# Patient Record
Sex: MALE | Race: WHITE | URBAN - METROPOLITAN AREA
[De-identification: names, ages, dates, MRNs, and addresses within clinical notes are randomized per-mention and may not be internally consistent; named-entity substitution may affect disease eponyms.]

---
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
## 2022-03-15 ENCOUNTER — EMERGENCY (EMERGENCY)
Facility: HOSPITAL | Age: 22
LOS: 0 days | Discharge: HOME | End: 2022-03-15
Attending: EMERGENCY MEDICINE | Admitting: EMERGENCY MEDICINE
Payer: COMMERCIAL

## 2022-03-15 VITALS
TEMPERATURE: 98 F | RESPIRATION RATE: 18 BRPM | OXYGEN SATURATION: 100 % | DIASTOLIC BLOOD PRESSURE: 56 MMHG | WEIGHT: 179.9 LBS | HEART RATE: 66 BPM | SYSTOLIC BLOOD PRESSURE: 110 MMHG
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
## 2022-03-15 DIAGNOSIS — S92.512A DISPLACED FRACTURE OF PROXIMAL PHALANX OF LEFT LESSER TOE(S), INITIAL ENCOUNTER FOR CLOSED FRACTURE: ICD-10-CM
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
## 2022-03-15 DIAGNOSIS — M79.672 PAIN IN LEFT FOOT: ICD-10-CM
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
## 2022-03-15 PROCEDURE — 73610 X-RAY EXAM OF ANKLE: CPT | Mod: 26,LT

## 2022-03-15 PROCEDURE — 99284 EMERGENCY DEPT VISIT MOD MDM: CPT

## 2022-03-15 PROCEDURE — 73630 X-RAY EXAM OF FOOT: CPT | Mod: 26,LT

## 2022-03-15 PROCEDURE — 99053 MED SERV 10PM-8AM 24 HR FAC: CPT

## 2022-03-15 RX ORDER — IBUPROFEN 200 MG
600 TABLET ORAL ONCE
Refills: 0 | Status: COMPLETED | OUTPATIENT
Start: 2022-03-15 | End: 2022-03-15

## 2022-03-15 RX ADMIN — Medication 600 MILLIGRAM(S): at 23:34

## 2022-03-15 NOTE — ED PROVIDER NOTE - WR ORDER NAME 2
[facility]    If you have any questions regarding to your visit please contact your care team:       Team Purple:   Clinic Hours Telephone Number   Dr. Lola Espino   7am-7pm  Monday - Thursday   7am-5pm  Fridays  [phone]  (Appointment scheduling available 24/7)    Questions about your recent visit?   Team Line:  [phone]   Urgent Care - [place] and [facility] - 11am-9pm Monday-Friday Saturday-Sunday- 9am-5pm   [place] - 5pm-9pm Monday-Friday Saturday-Sunday- 9am-5pm  [phone] - [place]  [phone] - [place]       What options do I have for a visit other than an office visit? We offer electronic visits (e-visits) and telephone visits, when medically appropriate.  Please check with your medical insurance to see if these types of visits are covered, as you will be responsible for any charges that are not paid by your insurance.      You can use [employer] (secure electronic communication) to access to your chart, send your primary care provider a message, or make an appointment. Ask a team member how to get started.     For a price quote for your services, please call our Consumer Price Line at [phone] or our Imaging Cost estimation line at [phone] (for imaging tests).    Wilfrid Carrasquillo    
Xray Ankle Complete 3 Views, Left
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
## 2022-03-15 NOTE — ED PROVIDER NOTE - CARE PROVIDER_API CALL
Silviano Santamaria)  [facility]  [address] [place] [address] [place] [address]  Phone: [phone]  Fax: [phone]  Follow Up Time: 4-6 Days

## 2022-03-15 NOTE — ED PROVIDER NOTE - PATIENT PORTAL LINK FT
You can access the FollowMyHealth Patient Portal offered by [facility] by registering at the following website: [email or web address]. By joining [facility]’s FollowMyHealth portal, you will also be able to view your health information using other applications (apps) compatible with our system.

## 2022-03-15 NOTE — ED PROVIDER NOTE - NSFOLLOWUPCLINICS_GEN_ALL_ED_FT
[facility]  Podiatry  .  [place]   Phone: [phone]  Fax:   Follow Up Time: 4-6 Days

## 2022-03-15 NOTE — ED PROVIDER NOTE - NSFOLLOWUPINSTRUCTIONS_ED_ALL_ED_FT
Toe Fracture    WHAT YOU NEED TO KNOW:    A toe fracture is a break in a bone in your toe.     DISCHARGE INSTRUCTIONS:    Seek care immediately if:   •Blood soaks through your bandage.  •You have severe pain in your toe.  •Your toe is cold or numb.    Call your doctor if:   •You have a fever.  •Your pain does not go away, even after treatment.  •Your toe continues to hurt even after it has healed.  •You have questions or concerns about your condition or care.    Medicines: You may need any of the following:   •NSAIDs, such as ibuprofen, help decrease swelling, pain, and fever. This medicine is available with or without a doctor's order. NSAIDs can cause stomach bleeding or kidney problems in certain people. If you take blood thinner medicine, always ask your healthcare provider if NSAIDs are safe for you. Always read the medicine label and follow directions.    •Prescription pain medicine may be given. Ask your healthcare provider how to take this medicine safely. Some prescription pain medicines contain acetaminophen. Do not take other medicines that contain acetaminophen without talking to your healthcare provider. Too much acetaminophen may cause liver damage. Prescription pain medicine may cause constipation. Ask your healthcare provider how to prevent or treat constipation.     •Antibiotics treat a bacterial infection. You may need antibiotics if you have an open wound.    •Take your medicine as directed. Contact your healthcare provider if you think your medicine is not helping or if you have side effects. Tell him of her if you are allergic to any medicine. Keep a list of the medicines, vitamins, and herbs you take. Include the amounts, and when and why you take them. Bring the list or the pill bottles to follow-up visits. Carry your medicine list with you in case of an emergency.    Self-care:   •Rest your toe so that it can heal. Return to normal activities as directed.    •Apply ice on your toe for 15 to 20 minutes every hour or as directed. Use an ice pack, or put crushed ice in a plastic bag. Cover it with a towel before you put it on your toe. Ice helps prevent tissue damage and decreases swelling and pain.    •Elevate your toe above the level of your heart as often as you can. This will help decrease swelling and pain. Prop your toe on pillows or blankets to keep it elevated comfortably.     •Use halle tape, an elastic bandage, or a splint as directed. These help keep your toe in its correct position as it heals. Halle tape means your fractured toe and the toe next to it are taped together.    •Use a support device such as a cane, crutches, walking boot, or hard soled shoe as directed. These help protect your toe and limit movement so it can heal.     Follow up with your doctor as directed: You may need to return in 2 to 4 weeks. Write down your questions so you remember to ask them during your visits.

## 2022-03-15 NOTE — ED PROVIDER NOTE - OBJECTIVE STATEMENT
Healthy, vaccinated 20-year-old male presents to the ED with left-sided foot pain.  Patient notes he was at kickboxing, kicked an opponent and had immediate pain to left second toe.  Pain is worse with ambulating.

## 2022-03-15 NOTE — ED PROVIDER NOTE - PHYSICAL EXAMINATION
VITAL SIGNS: I have reviewed nursing notes and confirm.  CONSTITUTIONAL: Well-developed; well-nourished; in no acute distress.  SKIN: Skin exam is warm and dry, no acute rash.  HEAD: Normocephalic; atraumatic.  EYES: PERRL, EOM intact; conjunctiva and sclera clear.  ENT: No nasal discharge; airway clear.  NECK: Supple; non tender.  CARD: RRR, no murmur  RESP: No wheezes, rales or rhonchi.  ABD: Normal bowel sounds; soft; non-distended; non-tender  EXT: significant swelling to L 2nd toe, pain with palpation, ROM, antalgic gait, good pulses  NEURO: Alert, oriented. Grossly unremarkable. No focal deficits.  PSYCH: Cooperative, appropriate.

## 2022-03-21 ENCOUNTER — APPOINTMENT (OUTPATIENT)
Dept: PODIATRY | Facility: CLINIC | Age: 22
End: 2022-03-21
Payer: COMMERCIAL

## 2022-03-21 ENCOUNTER — RESULT REVIEW (OUTPATIENT)
Age: 22
End: 2022-03-21

## 2022-03-21 PROCEDURE — 99072 ADDL SUPL MATRL&STAF TM PHE: CPT

## 2022-03-21 PROCEDURE — 99203 OFFICE O/P NEW LOW 30 MIN: CPT

## 2022-03-21 NOTE — ASSESSMENT
[FreeTextEntry1] : Xrays reviewed\par Body splint\par Darco shoe\par RTO 5 weeks with new xrays\par  [Verbal] : verbal [Patient] : patient [Good - alert, interested, motivated] : Good - alert, interested, motivated [Demonstrates independently] : demonstrates independently

## 2022-03-21 NOTE — REASON FOR VISIT
[Initial Visit] : an initial visit for [Confirmed Foot Fracture] : confirmed foot fracture [Foot Pain] : foot pain [FreeTextEntry2] : toe fracture

## 2022-03-21 NOTE — HISTORY OF PRESENT ILLNESS
[Post-op Sandals] : post-op sandals [FreeTextEntry1] : Left 2nd toe fracture\par - Sport related injury (kickboxing) \par - Very painful, Went to ED, Xrays confirmed toe fracture\par - Body splint and Darco shoe\par - Minimal pain today\par

## 2022-03-21 NOTE — PHYSICAL EXAM
[Delayed in the Right Toes] : capillary refills normal in right toes [Delayed in the Left Toes] : capillary refills normal in the left toes [2+] : left foot dorsalis pedis 2+ [de-identified] : pain with palpation and ROM of the 2nd toe left foot.  [FreeTextEntry1] : Swelling and bruising of the 2nd toe L foot [Sensation] : the sensory exam was normal to light touch and pinprick [No Focal Deficits] : no focal deficits

## 2022-04-22 ENCOUNTER — OUTPATIENT (OUTPATIENT)
Dept: OUTPATIENT SERVICES | Facility: HOSPITAL | Age: 22
LOS: 1 days | Discharge: HOME | End: 2022-04-22
Payer: COMMERCIAL

## 2022-04-22 DIAGNOSIS — M79.672 PAIN IN LEFT FOOT: ICD-10-CM

## 2022-04-22 PROCEDURE — 73630 X-RAY EXAM OF FOOT: CPT | Mod: 26,LT

## 2022-04-25 ENCOUNTER — APPOINTMENT (OUTPATIENT)
Dept: PODIATRY | Facility: CLINIC | Age: 22
End: 2022-04-25
Payer: COMMERCIAL

## 2022-04-25 VITALS
SYSTOLIC BLOOD PRESSURE: 122 MMHG | BODY MASS INDEX: 23.74 KG/M2 | DIASTOLIC BLOOD PRESSURE: 78 MMHG | TEMPERATURE: 97.1 F | HEIGHT: 74 IN | WEIGHT: 185 LBS | HEART RATE: 76 BPM | OXYGEN SATURATION: 91 %

## 2022-04-25 DIAGNOSIS — M79.672 PAIN IN LEFT FOOT: ICD-10-CM

## 2022-04-25 DIAGNOSIS — S92.515A NONDISPLACED FRACTURE OF PROXIMAL PHALANX OF LEFT LESSER TOE(S), INITIAL ENCOUNTER FOR CLOSED FRACTURE: ICD-10-CM

## 2022-04-25 PROCEDURE — 99072 ADDL SUPL MATRL&STAF TM PHE: CPT

## 2022-04-25 PROCEDURE — 99213 OFFICE O/P EST LOW 20 MIN: CPT

## 2022-04-25 NOTE — HISTORY OF PRESENT ILLNESS
[Post-op Sandals] : post-op sandals [FreeTextEntry1] : Left 2nd toe fracture\par - Body splint and Darco shoe\par - No pain\par - Getting better \par - new xrays taken

## 2022-04-25 NOTE — REASON FOR VISIT
[Follow-Up Visit] : a follow-up visit for [Confirmed Foot Fracture] : confirmed foot fracture [Foot Pain] : foot pain [FreeTextEntry2] : toe fracture

## 2022-04-25 NOTE — ASSESSMENT
[Verbal] : verbal [Patient] : patient [Good - alert, interested, motivated] : Good - alert, interested, motivated [Demonstrates independently] : demonstrates independently [FreeTextEntry1] : Xrays reviewed - fracture healing \par Darco shoe for one more week and then transition to regular shoe \par RTO PRN\par

## 2022-04-25 NOTE — PHYSICAL EXAM
[2+] : left foot dorsalis pedis 2+ [Skin Lesions] : no skin lesions [Sensation] : the sensory exam was normal to light touch and pinprick [No Focal Deficits] : no focal deficits [Delayed in the Right Toes] : capillary refills normal in right toes [Delayed in the Left Toes] : capillary refills normal in the left toes [de-identified] : Mild pain with palpation and ROM of the 2nd toe left foot.  [FreeTextEntry1] : Mild Swelling of the 2nd toe L foot

## 2022-07-14 ENCOUNTER — EMERGENCY (EMERGENCY)
Facility: HOSPITAL | Age: 22
LOS: 0 days | Discharge: HOME | End: 2022-07-14
Attending: EMERGENCY MEDICINE | Admitting: EMERGENCY MEDICINE

## 2022-07-14 VITALS
DIASTOLIC BLOOD PRESSURE: 63 MMHG | HEART RATE: 65 BPM | SYSTOLIC BLOOD PRESSURE: 127 MMHG | TEMPERATURE: 97 F | RESPIRATION RATE: 20 BRPM | WEIGHT: 180.34 LBS | OXYGEN SATURATION: 100 %

## 2022-07-14 PROCEDURE — 99284 EMERGENCY DEPT VISIT MOD MDM: CPT

## 2022-07-14 PROCEDURE — 99053 MED SERV 10PM-8AM 24 HR FAC: CPT

## 2022-07-14 PROCEDURE — 90792 PSYCH DIAG EVAL W/MED SRVCS: CPT | Mod: GC

## 2022-07-14 RX ORDER — SERTRALINE 25 MG/1
1 TABLET, FILM COATED ORAL
Qty: 30 | Refills: 0
Start: 2022-07-14 | End: 2022-08-12

## 2022-07-14 RX ORDER — HYDROXYZINE HCL 10 MG
1 TABLET ORAL
Qty: 90 | Refills: 0
Start: 2022-07-14 | End: 2022-08-12

## 2022-07-14 NOTE — ED PEDIATRIC NURSE NOTE - OBJECTIVE STATEMENT
pt presents with anxiety, reports that he has a lot going on at home and school   pt is calm and cooperative, pt placed in a hospital gown and belongings placed in hospital bag with mother

## 2022-07-14 NOTE — ED BEHAVIORAL HEALTH ASSESSMENT NOTE - NSBHATTESTCOMMENTATTENDFT_PSY_A_CORE
21 yo male with history of anxiety in treatment with therapist, but not on any psychotropic medications, who presents to the ED for worsening anxious mood and increasing panic attack occurring as often as 6 times a day for the past weeks. He presents to ED for Mental health evaluation and service connection, and on evaluation he is observed to be calm and cooperative, but there is overt presence of anxiety is the setting of stressor related to discordance with his father, stress at work and persistent marijuana use. This patient also has component of ADHD symptoms which will require further evaluation in the outpatient setting. Also, due to increase in severity of panic attack, he will strongly benefit from low dose of sertraline of 25mg po every morning. Psychoeducation provided to patient and mother who were both at bedside. There is no psychiatric contraindication to discharge this patient. He will be referred to [facility] outpatient psychiatry service located at [address] [place] [address], [id number], [phone] and patient will contacted for appointment date.

## 2022-07-14 NOTE — ED PROVIDER NOTE - OBJECTIVE STATEMENT
23 yo male, no PMHx, presents with anxiety. He states that over the last few weeks he has been under more stress and having anxiety attacks 5-6 times per day, no alleviating or aggravating factors, no associated symptoms. He is currently seeing a therapist but is unable to see a psychiatrist. Denies chest pain, SOB, nausea, vomiting, headache, dizziness, fevers, SI/HI, AVH.

## 2022-07-14 NOTE — ED BEHAVIORAL HEALTH ASSESSMENT NOTE - REFERRAL / APPOINTMENT DETAILS
We recommend that the patient be referred for outpatient psychiatric and psychotherapy services, to [facility] ([facility]D), [address] [place] [address], phone number : [phone]. Please ensure that this referral information is included in discharge document.

## 2022-07-14 NOTE — ED PEDIATRIC TRIAGE NOTE - NS_BH TRG Q4C_ED_A_ED
[FreeTextEntry1] : alert and oriented x 3, speech fluent, names easily, follows requests, good recall for recent and remote events.\par EOM VFF, full without sustained nystagmus, PERRL, face symmetrical, no dysarthria\par Motor - full strength in all extremities. normal rapid-alternating movements.\par Sensory - intact LT bilaterally\par Coord - no tremor, ataxia\par Gait - stands without difficulty, normal gait.\par 
No

## 2022-07-14 NOTE — ED PROVIDER NOTE - NSFOLLOWUPINSTRUCTIONS_ED_ALL_ED_FT
Managing Anxiety, Adult      After being diagnosed with an anxiety disorder, you may be relieved to know why you have felt or behaved a certain way. You may also feel overwhelmed about the treatment ahead and what it will mean for your life. With care and support, you can manage this condition and recover from it.      How to manage lifestyle changes      Managing stress and anxiety      Stress is your body's reaction to life changes and events, both good and bad. Most stress will last just a few hours, but stress can be ongoing and can lead to more than just stress. Although stress can play a major role in anxiety, it is not the same as anxiety. Stress is usually caused by something external, such as a deadline, test, or competition. Stress normally passes after the triggering event has ended.     Anxiety is caused by something internal, such as imagining a terrible outcome or worrying that something will go wrong that will devastate you. Anxiety often does not go away even after the triggering event is over, and it can become long-term (chronic) worry. It is important to understand the differences between stress and anxiety and to manage your stress effectively so that it does not lead to an anxious response.    Talk with your health care provider or a counselor to learn more about reducing anxiety and stress. He or she may suggest tension reduction techniques, such as:  •Music therapy. This can include creating or listening to music that you enjoy and that inspires you.      •Mindfulness-based meditation. This involves being aware of your normal breaths while not trying to control your breathing. It can be done while sitting or walking.      •Centering prayer. This involves focusing on a word, phrase, or sacred image that means something to you and brings you peace.      •Deep breathing. To do this, expand your stomach and inhale slowly through your nose. Hold your breath for 3–5 seconds. Then exhale slowly, letting your stomach muscles relax.      •Self-talk. This involves identifying thought patterns that lead to anxiety reactions and changing those patterns.      •Muscle relaxation. This involves tensing muscles and then relaxing them.      Choose a tension reduction technique that suits your lifestyle and personality. These techniques take time and practice. Set aside 5–15 minutes a day to do them. Therapists can offer counseling and training in these techniques. The training to help with anxiety may be covered by some insurance plans. Other things you can do to manage stress and anxiety include:  •Keeping a stress/anxiety diary. This can help you learn what triggers your reaction and then learn ways to manage your response.      •Thinking about how you react to certain situations. You may not be able to control everything, but you can control your response.      •Making time for activities that help you relax and not feeling guilty about spending your time in this way.      •Visual imagery and yoga can help you stay calm and relax.      Medicines     Medicines can help ease symptoms. Medicines for anxiety include:  •Anti-anxiety drugs.      •Antidepressants.      Medicines are often used as a primary treatment for anxiety disorder. Medicines will be prescribed by a health care provider. When used together, medicines, psychotherapy, and tension reduction techniques may be the most effective treatment.    Relationships    Relationships can play a big part in helping you recover. Try to spend more time connecting with trusted friends and family members. Consider going to couples counseling, taking family education classes, or going to family therapy. Therapy can help you and others better understand your condition.      How to recognize changes in your anxiety    Everyone responds differently to treatment for anxiety. Recovery from anxiety happens when symptoms decrease and stop interfering with your daily activities at home or work. This may mean that you will start to:  •Have better concentration and focus. Worry will interfere less in your daily thinking.      •Sleep better.      •Be less irritable.      •Have more energy.      •Have improved memory.      It is important to recognize when your condition is getting worse. Contact your health care provider if your symptoms interfere with home or work and you feel like your condition is not improving.      Follow these instructions at home:    Activity   •Exercise. Most adults should do the following:  •Exercise for at least 150 minutes each week. The exercise should increase your heart rate and make you sweat (moderate-intensity exercise).      •Strengthening exercises at least twice a week.        •Get the right amount and quality of sleep. Most adults need 7–9 hours of sleep each night.        Lifestyle      •Eat a healthy diet that includes plenty of vegetables, fruits, whole grains, low-fat dairy products, and lean protein. Do not eat a lot of foods that are high in solid fats, added sugars, or salt.      •Make choices that simplify your life.      • Do not use any products that contain nicotine or tobacco, such as cigarettes, e-cigarettes, and chewing tobacco. If you need help quitting, ask your health care provider.      •Avoid caffeine, alcohol, and certain over-the-counter cold medicines. These may make you feel worse. Ask your pharmacist which medicines to avoid.      General instructions     •Take over-the-counter and prescription medicines only as told by your health care provider.      •Keep all follow-up visits as told by your health care provider. This is important.        Where to find support    You can get help and support from these sources:  •Self-help groups.      •Online and community organizations.      •A trusted spiritual leader.      •Couples counseling.      •Family education classes.      •Family therapy.        Where to find more information    You may find that joining a support group helps you deal with your anxiety. The following sources can help you locate counselors or support groups near you:  •Mental Health [place]: www.mentalhealthamerica.net      •Anxiety and Depression Association of [place] (ADAA): www.adaa.org      •National [place] on Mental Illness (LISA): www.lisa.org        Contact a health care provider if you:    •Have a hard time staying focused or finishing daily tasks.      •Spend many hours a day feeling worried about everyday life.      •Become exhausted by worry.      •Start to have headaches, feel tense, or have nausea.      •Urinate more than normal.      •Have diarrhea.        Get help right away if you have:    •A racing heart and shortness of breath.      •Thoughts of hurting yourself or others.      If you ever feel like you may hurt yourself or others, or have thoughts about taking your own life, get help right away. You can go to your nearest emergency department or call:    • Your local emergency services (911 in the [place]       • A suicide crisis helpline, such as the National Suicide Prevention Lifeline at 1-338.964.9253. This is open 24 hours a day.         Summary    •Taking steps to learn and use tension reduction techniques can help calm you and help prevent triggering an anxiety reaction.      •When used together, medicines, psychotherapy, and tension reduction techniques may be the most effective treatment.      •Family, friends, and partners can play a big part in helping you recover from an anxiety disorder.      This information is not intended to replace advice given to you by your health care provider. Make sure you discuss any questions you have with your health care provider.

## 2022-07-14 NOTE — ED BEHAVIORAL HEALTH ASSESSMENT NOTE - HPI (INCLUDE ILLNESS QUALITY, SEVERITY, DURATION, TIMING, CONTEXT, MODIFYING FACTORS, ASSOCIATED SIGNS AND SYMPTOMS)
** **THIS IS AN INCOMPLETE NOTE. 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     Chart reviewed, patient seen and examined at bedside. On approach, patient calm and cooperative to interview, no acute distress.   Patient endorses being at the hospital for _______________________.        When asked about recent mood, patient states feeling “ _________________”     Patient denies suicidal ideation, intent, or plan on interview. Patient denies acute symptoms of depression, esmer, anxiety, PTSD, or psychosis at this time.     Patient also denies recent use of alcohol, nicotine, or illicit substances.     Psych hx:     Collateral: Saadia (mother, [phone]).    Home medications reconciled with pharmacy (name, #; prescribed by  _______ ): ** **THIS IS AN INCOMPLETE NOTE. 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 Patient endorsed 5-6 episodes per day of anxiety over the past several days to the ED. Stated that he sees a therapist, no psychiatrist. Denied SI/HI. Psych consult for mental health evaluation in setting of reported anxiety.      Chart reviewed, patient seen and examined at bedside. On approach, patient calm and cooperative to interview,     When asked about recent mood, patient states feeling “ _________________”     Patient denies suicidal ideation, intent, or plan on interview. Patient endorses acute symptoms of depression and anxiety. In terms of depressive symptoms, patient reports sleeplessness (insomnia; 3-4 hours of sleep per night), decreased interest in activities (including social activities, hobbies, and going to the gym), feelings of worthlessness, decreased energy, decreased concentration, decreased appetite (states that he does not have desire to eat), demonstrates psychomotor retardation, and denies suicidality.     Patient denies acute symptoms of esmer, PTSD, or psychosis at this time.     Patient endorses recent use of nicotine and marijuana. Patient states he has been using nicotine vape daily since his teens, and has been consuming marijuana daily since his teens. Patient denies recent use of EtOH or any illicit substances.     Psych hx: reports possible prior diagnosis of ADHD many years ago by a psychiatrist, but doesn't remember who. States that he was started on a medication for reported ADHD that worsened his feeling of anxiety, so he stopped taking it.     Collateral: Saadia (mother, [phone]).     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 Patient endorsed 5-6 episodes per day of anxiety over the past several days to the ED. Stated that he sees a therapist, no psychiatrist. Denied SI/HI. Psych consult for mental health evaluation in setting of reported anxiety.      Chart reviewed, patient seen and examined at bedside. On approach, patient calm and cooperative to interview, When asked about recent mood, patient states feeling “ _________________”     ** **THIS IS AN INCOMPLETE NOTE. PLAN HAS NOT BEEN FINALIZED BY ATTENDING*****FULL NOTE TO FOLLOW SHORTLY****       Patient denies suicidal ideation, intent, or plan on interview. Patient endorses acute symptoms of depression and anxiety. In terms of depressive symptoms, patient reports sleeplessness (insomnia; 3-4 hours of sleep per night), decreased interest in activities (including social activities, hobbies, and going to the gym), feelings of worthlessness, decreased energy, decreased concentration, decreased appetite (states that he does not have desire to eat), demonstrates psychomotor retardation, and denies suicidality.     Patient denies acute symptoms of esmer, PTSD, or psychosis at this time.     Patient endorses recent use of nicotine and marijuana. Patient states he has been using nicotine vape daily since his teens, and has been consuming marijuana daily since his teens. Patient denies recent use of EtOH or any illicit substances.     Psych hx: reports possible prior diagnosis of ADHD many years ago by a psychiatrist, but doesn't remember who. States that he was started on a medication for reported ADHD that worsened his feeling of anxiety, so he stopped taking it.     Collateral: Saadia (mother, [phone]).   ** **THIS IS AN INCOMPLETE NOTE. 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 Patient endorsed 5-6 episodes per day of anxiety over the past several days to the ED. Stated that he sees a therapist, no psychiatrist. Denied SI/HI. Psych consult for mental health evaluation in setting of reported anxiety.      Chart reviewed, patient seen and examined at bedside. On approach, patient calm and cooperative to interview, When asked about recent mood, patient states feeling “ _________________”     ** **THIS IS AN INCOMPLETE NOTE. PLAN HAS NOT BEEN FINALIZED BY ATTENDING*****FULL NOTE TO FOLLOW SHORTLY****       Patient denies suicidal ideation, intent, or plan on interview. Patient endorses acute symptoms of depression and anxiety. In terms of depressive symptoms, patient reports sleeplessness (insomnia; 3-4 hours of sleep per night), decreased interest in activities (including social activities, hobbies, and going to the gym), feelings of worthlessness, decreased energy, decreased concentration, decreased appetite (states that he does not have desire to eat), demonstrates psychomotor retardation, and denies suicidality.     Patient denies acute symptoms of esmer, PTSD, or psychosis at this time.     Patient endorses recent use of nicotine and marijuana. Patient states he has been using nicotine vape daily since his teens, and has been consuming marijuana daily since his teens. Patient denies recent use of EtOH or any illicit substances.     Psych hx: reports possible prior diagnosis of ADHD many years ago by a psychiatrist, but doesn't remember who. States that he was started on a medication for reported ADHD that worsened his feeling of anxiety, so he stopped taking it. Patient states he has been seeing a therapist, Dahiana Burr, for several years, but doesn't feel that the therapy sessions have been helping at this time.     Collateral: Saadia (mother, [phone]).   ** **THIS IS AN INCOMPLETE NOTE. 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 company, and occasionally works as  part-time) domiciled with mother, father and siblings in private residence, no PMH, no past formal psychiatric history, presents to ED brought in by mom for worsening symptoms of anxiety. Patient endorsed 5-6 episodes per day of anxiety over the past several days to the ED. Stated that he sees a therapist, no psychiatrist. Denied SI/HI. Psych consult for mental health evaluation in setting of reported anxiety.      Chart reviewed, patient seen and examined at bedside. On approach, patient calm and cooperative to interview, When asked about recent mood, patient states feeling “ _________________”     When asked about recent mood, patient states feeling "overwhelmed" and "anxious". He states that he has been experiencing anxiety for a long time and describes anxiety/panic attacks occurring intermittently. The severity and frequency of the attacks increased following a break-up with a significant other in May and were exacerbated further following a fight with his father last week.  He endorses palpitations and a feeling of "emotions coming over" him during these attacks and notes that the attacks can last between 20min - 1hr. Patient notes a sleep changes for which he was taking melatonin which seemed to help him somewhat. Patient seemed to fidget throughout the interview.  Patient states that he has numerous stresses in his life (school, work, family life) and his anxiety is exacerbated when he is at home and attributes some episodes of anxiety to when he is at work with his father. He feels he has a lot of responsibility and gets no pleasure in return. He has no history of sexual or physical abuse.    Patient denies suicidal ideation, intent, or plan on interview. Patient endorses acute symptoms of depression and anxiety. In terms of depressive symptoms, patient reports sleeplessness (insomnia; 3-4 hours of sleep per night), decreased interest in activities (including social activities, hobbies, and going to the gym), feelings of worthlessness, decreased energy, decreased concentration, decreased appetite (states that he does not have desire to eat), demonstrates psychomotor retardation, and denies suicidality.     Patient denies acute symptoms of esmer, PTSD, or psychosis at this time.     Patient endorses recent use of nicotine and marijuana. Patient states he has been using nicotine vape daily since his teens, and has been consuming marijuana daily since his teens. Patient denies recent use of EtOH or any illicit substances.     Psych hx: reports possible prior diagnosis of ADHD many years ago by a psychiatrist, but doesn't remember who. States that he was started on a medication for reported ADHD that worsened his feeling of anxiety, so he stopped taking it. Patient states he has been seeing a therapist, Dahiana Burr, for several years, but doesn't feel that the therapy sessions have been helping at this time.     Collateral: Saadia (mother, [phone]). Mother states that the patient has been "very sensitive his whole life" and "anything that happens is a big deal." States that patient has not been getting along with dad for the past 2-3 years, reportedly patient has been trying to make amends in relationship, but feels like the relationship is not improving despite these efforts. States that patient had been seeing a girl, but that their relationship was recently put on pause. States that patient's outlook is negative, patient having difficulty seeing the bright side of various situations, and outlook "getting worse" recently. Endorses patient has been experiencing recent insomnia and worsening appetite. States that patient had an argument with his father about 1 week ago, and that she has been advocating that he sees a psychiatrist. States that initially patient was hesitant, but today decided to come in to see a psychiatrist.     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 Patient endorsed 5-6 episodes per day of anxiety over the past several days to the ED. Stated that he sees a therapist, no psychiatrist. Denied SI/HI. Psych consult for mental health evaluation in setting of reported anxiety.      When asked about recent mood, patient states feeling "overwhelmed" and "anxious". He states that he has been experiencing anxiety for a long time and describes anxiety/panic attacks occurring intermittently. The severity and frequency of the attacks increased following a break-up with a significant other in May and were exacerbated further following a fight with his father last week.  He endorses palpitations and a feeling of "emotions coming over" him during these attacks and notes that the attacks can last between 20min - 1hr. Patient notes a sleep changes for which he was taking melatonin which seemed to help him somewhat. Patient seemed to fidget throughout the interview.  Patient states that he has numerous stresses in his life (school, work, family life) and his anxiety is exacerbated when he is at home and attributes some episodes of anxiety to when he is at work with his father. He feels he has a lot of responsibility and gets no pleasure in return. He has no history of sexual or physical abuse.    Patient denies suicidal ideation, intent, or plan on interview. Patient endorses acute symptoms of depression and anxiety. In terms of depressive symptoms, patient reports sleeplessness (insomnia; 3-4 hours of sleep per night), decreased interest in activities (including social activities, hobbies, and going to the gym), feelings of worthlessness, decreased energy, decreased concentration, decreased appetite (states that he does not have desire to eat), demonstrates psychomotor retardation, and denies suicidality.  Patient denies acute symptoms of esmer, PTSD, or psychosis at this time. Patient endorses recent use of nicotine and marijuana. Patient states he has been using nicotine vape daily since his teens, and has been consuming marijuana daily since his teens. Patient denies recent use of EtOH or any illicit substances.     Psych hx: reports possible prior diagnosis of ADHD many years ago by a psychiatrist, but doesn't remember who. States that he was started on a medication for reported ADHD that worsened his feeling of anxiety, so he stopped taking it. Patient states he has been seeing a therapist, Dahiana Burr, for several years, but doesn't feel that the therapy sessions have been helping at this time.     Collateral: Saadia (mother, [phone]). Mother states that the patient has been "very sensitive his whole life" and "anything that happens is a big deal." States that patient has not been getting along with dad for the past 2-3 years, reportedly patient has been trying to make amends in relationship, but feels like the relationship is not improving despite these efforts. States that patient had been seeing a girl, but that their relationship was recently put on pause. States that patient's outlook is negative, patient having difficulty seeing the bright side of various situations, and outlook "getting worse" recently. Endorses patient has been experiencing recent insomnia and worsening appetite. States that patient had an argument with his father about 1 week ago, and that she has been advocating that he sees a psychiatrist. States that initially patient was hesitant, but today decided to come in to see a psychiatrist.     Preferred Pharmacy: [address]  - no current medications

## 2022-07-14 NOTE — ED BEHAVIORAL HEALTH ASSESSMENT NOTE - ESTIMATED INTELLIGENCE
Patient wants to know if she should fast for the lab test she is having on Tuesday.   It is a BMP, advised best if she fasts.            Average

## 2022-07-14 NOTE — ED PROVIDER NOTE - PROGRESS NOTE DETAILS
CP: Patient SMART cleared. Attempted to reach psych. Unable to reach at this time. CP: discussed with psych team. patient to be sent home with 30 day supply of atarax 50 mg q8h and sertraline 25 mg daily. patient agreeable to plan and follow up OPD.

## 2022-07-14 NOTE — ED PROVIDER NOTE - NS ED ROS FT
GEN:  no fever, no chills, no generalized weakness  NEURO:  no headache, no dizziness  ENT: no sore throat, no runny nose  CV:  no chest pain, no palpitations  RESP:  no sob, no cough  GI:  no nausea, no vomiting, no abdominal pain, no diarrhea  :  no dysuria, no urinary frequency, no hematuria  MSK:  no joint pain, no edema  SKIN:  no rash, no bruising  PSYCH:  no homicidal ideation, no suicidal ideation, no visual hallucinations, no auditory hallucinations, +anxiety

## 2022-07-14 NOTE — ED PEDIATRIC TRIAGE NOTE - CHIEF COMPLAINT QUOTE
Pt c/o feeling anxious and having panic attacks, having suicidal thoughts, told his mother he wanted to kill himself, pt placed on 1:1 observation

## 2022-07-14 NOTE — ED PROVIDER NOTE - PHYSICAL EXAMINATION
CONSTITUTIONAL: Well-developed; well-nourished; in no acute distress.   SKIN: warm, dry  HEAD: Normocephalic; atraumatic.  EYES: no conjunctival injection  ENT: No nasal discharge  NECK: Supple  CARD: S1, S2 normal; Regular rate and rhythm.   RESP: No wheezes, rales or rhonchi.  ABD: soft ntnd  EXT: Normal ROM.  No clubbing, cyanosis or edema.   NEURO: Alert, oriented, grossly unremarkable.  PSYCH: Cooperative, appropriate.

## 2022-07-14 NOTE — ED PROVIDER NOTE - NSFOLLOWUPCLINICS_GEN_ALL_ED_FT
[facility]  OP [facility]  [address] [place] [id number]  Phone: [phone]  Fax:   Follow Up Time: 1-3 Days

## 2022-07-14 NOTE — ED PROVIDER NOTE - ATTENDING CONTRIBUTION TO CARE
22-year-old male past medical history of anxiety for her several years in the care of a therapist, currently not on any medications presented for evaluation of severe anxiety for the past few weeks.  Patient states that his anxiety is precipitated by relationship issues, problems at school and at work.  He reports difficulty sleeping, decreased appetite, feeling sad.  Denies any active suicidal ideations or plan, denies any ingestions, drug or alcohol use.  Denies any physical complaints.  Well-appearing young male in no distress, PERRL, normal work of breathing, speaking full sentences, lungs clear to auscultation bilaterally, no midline spine or CVA TTP, RRR, well-perfused extremities, abdomen soft nontender to palpation, awake and alert x3, no gross neurodeficits, appears sad, but pleasant and cooperative.  Plan: Psychiatry evaluation.  Mom and patient are amenable with the plan.

## 2022-07-14 NOTE — ED BEHAVIORAL HEALTH ASSESSMENT NOTE - CURRENT PASSIVE IDEATION:
Constitutional:  See HPI.   Eyes:  No visual changes, eye pain or discharge.  ENMT:  No hearing changes, pain, discharge or infections. No neck pain or stiffness.  Cardiac:  No chest pain, SOB or edema. No chest pain with exertion.  Respiratory:  No cough or respiratory distress. No hemoptysis.  GI:  + nausea, No vomiting, diarrhea, + abdominal pain.  :  No dysuria, frequency, hematuria  MS:  No joint pain or back pain.  Neuro:  No LOC. No headache or weakness.    Skin:  No skin rash.  Except as in HPI, all other review of systems is negative None known

## 2022-07-14 NOTE — ED BEHAVIORAL HEALTH ASSESSMENT NOTE - DESCRIPTION
none Per ED,  " 21 yo male, no PMHx, presents with anxiety. He states that over the last few weeks he has been under more stress and having anxiety attacks 5-6 times per day, no alleviating or aggravating factors, no associated symptoms. He is currently seeing a therapist but is unable to see a psychiatrist. Denies chest pain, SOB, nausea, vomiting, headache, dizziness, fevers, SI/HI, AVH" see hpi Per ED,  " 21 yo male, no PMHx, presents with anxiety. He states that over the last few weeks he has been under more stress and having anxiety attacks 5-6 times per day, no alleviating or aggravating factors, no associated symptoms. He is currently seeing a therapist but is unable to see a psychiatrist. Denies chest pain, SOB, nausea, vomiting, headache, dizziness, fevers, Suicidal, auditory or visual hallucination

## 2022-07-14 NOTE — ED BEHAVIORAL HEALTH ASSESSMENT NOTE - RISK ASSESSMENT
Low Acute Suicide Risk risk:  protective: risk factors: mood episodes  protective factors: psychosocial support, employment, school, willingness to seek help, no SI, no SA

## 2022-07-14 NOTE — ED PROVIDER NOTE - PATIENT PORTAL LINK FT
You can access the FollowMyHealth Patient Portal offered by [facility] by registering at the following website: [email or web address]. By joining [employer]’s FollowMyHealth portal, you will also be able to view your health information using other applications (apps) compatible with our system.

## 2022-07-14 NOTE — ED BEHAVIORAL HEALTH ASSESSMENT NOTE - SUMMARY
** **THIS IS AN INCOMPLETE NOTE. 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 Patient endorsed 5-6 episodes per day of anxiety over the past several days to the ED. Stated that he sees a therapist, no psychiatrist. Denied SI/HI. Psych consult for mental health evaluation in setting of reported anxiety.         Impression:  - MDD  - possible ELIZABETH    Recommendations: ** **THIS IS AN INCOMPLETE NOTE. 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 Patient endorsed 5-6 episodes per day of anxiety over the past several days to the ED. Stated that he sees a therapist, no psychiatrist. Denied SI/HI. Psych consult for mental health evaluation in setting of reported anxiety.     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 Patient endorsed 5-6 episodes per day of anxiety over the past several days to the ED. Stated that he sees a therapist, no psychiatrist. Denied SI/HI. Psych consult for mental health evaluation in setting of reported anxiety.      On assessment,   ** **THIS IS AN INCOMPLETE NOTE. PLAN HAS NOT BEEN FINALIZED BY ATTENDING*****FULL NOTE TO FOLLOW SHORTLY****       At this time, the patient is not considered an imminent danger to self or others and does not warrant inpatient psychiatric hospitalization (IPP). This patient is cleared from a psychiatric perspective. No psychiatric contraindications to discharge.      Patient was educated of the risks, benefits, and alternatives of their psychiatric medications, including common potential side effects of medications. Patient expressed understanding and consents to ongoing medication management.     Impression:  - unspecified anxiety disorder with panic attacks   - r/o ADHD  - cannabis-induced mood disorder   - MDD     Recommendations:  *- start sertraline 25mg qAM for anxiety/depression. Prescribe 30 day supply on discharge.   *- atarax (hydroxyzine) 50 mg q8h PRN for anxiety/insomnia. Prescribe 30 day supply on discharge  - discussed with patient and family member to reduce marijuana and cigarette usage due to risk of worsening mood symptoms   - We recommend that the patient be referred for outpatient psychiatric and psychotherapy services, to [facility] ([facility]), [address] [place] [address], phone number : [phone]. Please ensure that this referral information is included in discharge document. 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 Patient endorsed 5-6 episodes per day of anxiety over the past several days to the ED. Stated that he sees a therapist, no psychiatrist. Denied SI/HI. Psych consult for mental health evaluation in setting of reported anxiety.      On assessment,   ** **THIS IS AN INCOMPLETE NOTE. PLAN HAS NOT BEEN FINALIZED BY ATTENDING*****FULL NOTE TO FOLLOW SHORTLY****       At this time, the patient is not considered an imminent danger to self or others and does not warrant inpatient psychiatric hospitalization (IPP). This patient is cleared from a psychiatric perspective. No psychiatric contraindications to discharge.      Patient was educated of the risks, benefits, and alternatives of their psychiatric medications, including common potential side effects of medications. Patient expressed understanding and consents to ongoing medication management.     Impression:  - unspecified anxiety disorder with panic attacks   - r/o ADHD  - cannabis-induced mood disorder   - MDD     Recommendations:  *- start sertraline 25mg qAM for anxiety/depression. Prescribe 30 day supply on discharge to [facility] on [facility].   *- atarax (hydroxyzine) 50 mg q8h PRN for anxiety/insomnia. Prescribe 30 day supply on discharge to [facility] on [facility].   - discussed with patient and family member to reduce marijuana and cigarette usage due to risk of worsening mood symptoms   - We recommend that the patient be referred for outpatient psychiatric and psychotherapy services, to [facility]ry Outpatient Department ([facility]D), [address] [place] [id number], phone number : [phone]. Please ensure that this referral information is included in discharge document.   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 Patient endorsed 5-6 episodes per day of anxiety over the past several days to the ED. Stated that he sees a therapist, no psychiatrist. Denied SI/HI. Psych consult for mental health evaluation in setting of reported anxiety.      On assessment patient endorsed sleep changes, interest decreases, feeling of guilt/worthlessness, concentration issues that affect his functioning, loss of energy, appetite changes, and psychomotor retardation. He also endorses numerous episodes of anxiety with palpitations and a feeling of being overwhelmed. He also notes chronic forgetfulness and an inability to follow directions adequetaly. He also notes daily consumption of cannabis. He denies an suicidal ideation, symptoms of psychosis, PTSD or esmer. Patient likely is experiencing anxiety/panic attacks with major depressive disorder, cannabis use disorder and possible ADHD.     At this time, the patient is not considered an imminent danger to self or others and does not warrant inpatient psychiatric hospitalization (IPP). This patient is cleared from a psychiatric perspective. No psychiatric contraindications to discharge.      Patient was educated of the risks, benefits, and alternatives of their psychiatric medications, including common potential side effects of medications. Patient expressed understanding and consents to ongoing medication management.     Impression:  - unspecified anxiety disorder with panic attacks   - r/o ADHD  - cannabis-induced mood disorder   - MDD     Recommendations:  *- start sertraline 25mg qAM for anxiety/depression. Prescribe 30 day supply on discharge to [facility] on [facility].   *- atarax (hydroxyzine) 50 mg q8h PRN for anxiety/insomnia. Prescribe 30 day supply on discharge to [facility] on [facility].   - discussed with patient and family member to reduce marijuana and cigarette usage due to risk of worsening mood symptoms   - We recommend that the patient be referred for outpatient psychiatric and psychotherapy services, to [facility] Out[facility] ([facility]D), [address] [place] [address], phone number : [phone]. Please ensure that this referral information is included in discharge document.   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 Patient endorsed 5-6 episodes per day of anxiety over the past several days to the ED. Stated that he sees a therapist, no psychiatrist. Denied SI/HI. Psych consult for mental health evaluation in setting of reported anxiety.      On assessment patient endorsed sleep changes, interest decreases, feeling of guilt/worthlessness, concentration issues that affect his functioning, loss of energy, appetite changes, and psychomotor retardation. He also endorses numerous episodes of anxiety with palpitations and a feeling of being overwhelmed. He also notes chronic forgetfulness and an inability to follow directions adequately. He also notes daily consumption of cannabis. He denies an suicidal ideation, symptoms of psychosis, PTSD or esmer. Patient likely is experiencing anxiety/panic attacks with major depressive disorder, cannabis use disorder and possible ADHD.     At this time, the patient is not considered an imminent danger to self or others and does not warrant inpatient psychiatric hospitalization (IPP). This patient is cleared from a psychiatric perspective. No psychiatric contraindications to discharge.      Patient was educated of the risks, benefits, and alternatives of their psychiatric medications, including common potential side effects of medications. Patient expressed understanding and consents to ongoing medication management.     Impression:  - unspecified anxiety disorder with panic attacks   - r/o ADHD  - cannabis-induced mood disorder   - MDD     Recommendations:  *- start sertraline 25mg qAM for anxiety/depression. Prescribe 30 day supply on discharge to [facility] on [facility].   *- atarax (hydroxyzine) 50 mg q8h PRN for anxiety/insomnia. Prescribe 30 day supply on discharge to [facility] on [facility].   - discussed with patient and family member to reduce marijuana and cigarette usage due to risk of worsening mood symptoms   - We recommend that the patient be referred for outpatient psychiatric and psychotherapy services, to [facility] Psychiatry Outpatient Department ([facility]D), [address] [place] [id number], phone number : [phone]. Please ensure that this referral information is included in discharge document.   - preferred callback numbers for OPD referral:   ---- patient's cell - [phone]   ---- mother's cell ([place]) - [phone]  - this document to be faxed to OPD for expedited referral  - Ext #[phone]

## 2022-07-14 NOTE — ED BEHAVIORAL HEALTH ASSESSMENT NOTE - PAST PSYCHOTROPIC MEDICATION
reportedly has been on a medication for prior diagnosis of ADHD, doesn't know which one, but stated that it made him more anxious and he stopped taking the medication after 1-2 uses

## 2022-07-16 DIAGNOSIS — F17.200 NICOTINE DEPENDENCE, UNSPECIFIED, UNCOMPLICATED: ICD-10-CM

## 2022-07-16 DIAGNOSIS — R63.0 ANOREXIA: ICD-10-CM

## 2022-07-16 DIAGNOSIS — F41.9 ANXIETY DISORDER, UNSPECIFIED: ICD-10-CM

## 2022-08-16 ENCOUNTER — OUTPATIENT (OUTPATIENT)
Dept: OUTPATIENT SERVICES | Facility: HOSPITAL | Age: 22
LOS: 1 days | Discharge: HOME | End: 2022-08-16

## 2022-08-16 ENCOUNTER — APPOINTMENT (OUTPATIENT)
Dept: PSYCHIATRY | Facility: CLINIC | Age: 22
End: 2022-08-16

## 2022-08-16 PROCEDURE — 90792 PSYCH DIAG EVAL W/MED SRVCS: CPT

## 2022-08-16 RX ORDER — SERTRALINE HYDROCHLORIDE 50 MG/1
50 TABLET, FILM COATED ORAL DAILY
Qty: 30 | Refills: 0 | Status: ACTIVE | COMMUNITY
Start: 2022-08-16 | End: 1900-01-01

## 2022-08-18 DIAGNOSIS — F33.1 MAJOR DEPRESSIVE DISORDER, RECURRENT, MODERATE: ICD-10-CM

## 2022-08-18 DIAGNOSIS — F41.1 GENERALIZED ANXIETY DISORDER: ICD-10-CM

## 2022-08-18 DIAGNOSIS — F41.0 PANIC DISORDER [EPISODIC PAROXYSMAL ANXIETY]: ICD-10-CM

## 2022-08-31 ENCOUNTER — APPOINTMENT (OUTPATIENT)
Dept: PSYCHIATRY | Facility: CLINIC | Age: 22
End: 2022-08-31

## 2022-08-31 ENCOUNTER — OUTPATIENT (OUTPATIENT)
Dept: OUTPATIENT SERVICES | Facility: HOSPITAL | Age: 22
LOS: 1 days | Discharge: HOME | End: 2022-08-31

## 2022-08-31 DIAGNOSIS — F41.1 GENERALIZED ANXIETY DISORDER: ICD-10-CM

## 2022-08-31 DIAGNOSIS — F41.0 PANIC DISORDER [EPISODIC PAROXYSMAL ANXIETY]: ICD-10-CM

## 2022-08-31 DIAGNOSIS — F33.1 MAJOR DEPRESSIVE DISORDER, RECURRENT, MODERATE: ICD-10-CM

## 2022-08-31 PROCEDURE — 99214 OFFICE O/P EST MOD 30 MIN: CPT

## 2022-10-24 ENCOUNTER — APPOINTMENT (OUTPATIENT)
Dept: PSYCHIATRY | Facility: CLINIC | Age: 22
End: 2022-10-24

## 2022-10-24 DIAGNOSIS — F41.0 GENERALIZED ANXIETY DISORDER: ICD-10-CM

## 2022-10-24 DIAGNOSIS — F41.1 GENERALIZED ANXIETY DISORDER: ICD-10-CM

## 2022-10-24 DIAGNOSIS — F33.1 MAJOR DEPRESSIVE DISORDER, RECURRENT, MODERATE: ICD-10-CM

## 2022-10-24 PROCEDURE — 99214 OFFICE O/P EST MOD 30 MIN: CPT | Mod: 95

## 2022-10-24 NOTE — REASON FOR VISIT
[Home] : at home, [unfilled] , at the time of the visit. [Medical Office: ([facility])___] : at the medical office located in  [Patient] : the patient [Self] : self [This encounter was initiated by telehealth (audio with video) and converted to telephone (audio only) due to technical difficulties.] : This encounter was initiated by telehealth (audio with video) and converted to telephone (audio only) due to technical difficulties.

## 2022-10-24 NOTE — HISTORY OF PRESENT ILLNESS
[de-identified] : he reports to have started zoloft but still not consistent. he is tolerating well and no side effects. he feels drowsy if he takes it in morning hence taking at night. he had no panic episodes but has situationally exacerbated anxiety with physical symptoms. it is lessoning. mood is stable. his sleep is improving with normal latency. no prn used. he is very busy with his work and also addressing legal issues related to speeding violations over the years. he is positive and motivated. 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  [No] : no [de-identified] : Reviewed and referred to ed document and brought forth to past hx section.\par \par 21 yo male with history of anxiety in treatment with therapist, but not on any psychotropic medications, who presents here in person after his visit to the ED for worsening anxious mood and increasing panic attack occurring as often as 6 times a day one cynthia ago. he was started on sertraline of 25mg po every morning and hydroxyzine 50 mg tid prn.\par \par he reports to have stopped medication due to sedative effect. he continue feel anxious several time both precipitated and spontaneous. most episodes are associated with physical symptoms and is difficult to manage. he admits mood is reactive and depressed at times. no loss of interest or lack of motivation and has learnt to deal with it. he has sleep wake cycle disruption due to life style. appetite normal. he admits use of recreational marijuana. no drug or alcohol use. no s/h ideations. he has significant cultural conflict of being in [place] and expected to follow norm, Restorationist and culture of [place] as his parents are.  [None] : none [Responsibility to family or others] : responsibility to family or others [None Known] : none known [Residential stability] : residential stability [Relationship stability] : relationship stability

## 2022-10-24 NOTE — PAST MEDICAL HISTORY
[FreeTextEntry1] : \par  ED Behavioral Health Assessment Note [Charted Location: [facility] ED] [Authored: 14-Jul-2022 10:48]- for Visit: [id number], Complete, Revised, Signed in Full, Available to Patient\par \par TELEPSYCHIATRY: \par  TelePsychiatry:\par · Telepsychiatry?	No\par \par DEMOGRAPHICS: \par  Demographics:\par · Time consult performed	14-Jul-2022 10:48\par · Source of Information	Patient\par · Mode of Arrival	Walk in / drive in\par · Accompanied By	Self, Family member\par · Referred By	Other\par · Other	mother\par · Domicile Type	Private Residence\par · Domiciled With	Alone\par · Dependents	None known\par · Marital Status	Single\par · Race	Other / \par · Employment	Student\par · Currently Enrolled Student	Yes\par · Level	Undergraduate\par · Special Education	No\par · Name of school	[employer]\par · Preferred Language	English\par \par BACKGROUND INFORMATION: \par  Background Information:\par · Source of Information	Patient\par · Known psychiatric admission within the past 30 days	No\par · Medical Record Reviewed	Yes\par · Records	Hospital chart\par · Consent obtained (other than for hospital chart)	Yes\par \par HPI: \par  HPI:\par · Reason For Referral	mental health evaluation\par · Patient's Chief Complaint	"I've been having anxiety"\par 	\par · HPI (include illness quality, severity, duration, timing, context, modifying factors, associated signs and symptoms)	21 yo [place]ian-American single male, 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 Patient endorsed 5-6 episodes per day of anxiety over the past several days to the ED. Stated that he sees a therapist, no psychiatrist. Denied SI/HI. Psych consult for mental health evaluation in setting of reported anxiety.  \par \par When asked about recent mood, patient states feeling "overwhelmed" and "anxious". He states that he has been experiencing anxiety for a long time and describes anxiety/panic attacks occurring intermittently. The severity and frequency of the attacks increased following a break-up with a significant other in May and were exacerbated further following a fight with his father last week.  He endorses palpitations and a feeling of "emotions coming over" him during these attacks and notes that the attacks can last between 20min - 1hr. Patient notes a sleep changes for which he was taking melatonin which seemed to help him somewhat. Patient seemed to fidget throughout the interview.  Patient states that he has numerous stresses in his life (school, work, family life) and his anxiety is exacerbated when he is at home and attributes some episodes of anxiety to when he is at work with his father. He feels he has a lot of responsibility and gets no pleasure in return. He has no history of sexual or physical abuse.\par \par Patient denies suicidal ideation, intent, or plan on interview. Patient endorses acute symptoms of depression and anxiety. In terms of depressive symptoms, patient reports sleeplessness (insomnia; 3-4 hours of sleep per night), decreased interest in activities (including social activities, hobbies, and going to the gym), feelings of worthlessness, decreased energy, decreased concentration, decreased appetite (states that he does not have desire to eat), demonstrates psychomotor retardation, and denies suicidality.  Patient denies acute symptoms of esmer, PTSD, or psychosis at this time. Patient endorses recent use of nicotine and marijuana. Patient states he has been using nicotine vape daily since his teens, and has been consuming marijuana daily since his teens. Patient denies recent use of EtOH or any illicit substances. \par \par Psych hx: reports possible prior diagnosis of ADHD many years ago by a psychiatrist, but doesn't remember who. States that he was started on a medication for reported ADHD that worsened his feeling of anxiety, so he stopped taking it. Patient states he has been seeing a therapist, Dahiana Burr, for several years, but doesn't feel that the therapy sessions have been helping at this time. \par \par Collateral: Saadia (mother, [phone]). Mother states that the patient has been "very sensitive his whole life" and "anything that happens is a big deal." States that patient has not been getting along with dad for the past 2-3 years, reportedly patient has been trying to make amends in relationship, but feels like the relationship is not improving despite these efforts. States that patient had been seeing a girl, but that their relationship was recently put on pause. States that patient's outlook is negative, patient having difficulty seeing the bright side of various situations, and outlook "getting worse" recently. Endorses patient has been experiencing recent insomnia and worsening appetite. States that patient had an argument with his father about 1 week ago, and that she has been advocating that he sees a psychiatrist. States that initially patient was hesitant, but today decided to come in to see a psychiatrist. \par \par Preferred Pharmacy: [address]\par - no current medications\par \par ED COURSE: \par  ED Course (up until assessment):\par · Description	Per ED,  " 21 yo male, no PMHx, presents with anxiety. He states that over the last few weeks he has been under more stress and having anxiety attacks 5-6 times per day, no alleviating or aggravating factors, no associated symptoms. He is currently seeing a therapist but is unable to see a psychiatrist. Denies chest pain, SOB, nausea, vomiting, headache, dizziness, fevers, Suicidal, auditory or visual hallucination\par 	\par · Psychiatric Medication Given	None\par · Four-point physical restraints in ED	No\par · Utilization of 1 to 1 in ED	Yes\par \par SUICIDALITY RISK ASSESSMENT: \par  Suicidality In The Past Month (C-SSRS Screener for Emergency Departments):\par · Have you wished you were dead or wished you could go to sleep and not wake up?	No\par · Have you actually had any thoughts of killing yourself?	No\par · Have you ever done anything, started to do anything, or prepared to do anything to end your life?	No\par · Additional details of suicidal ideation (frequency, duration, controllability, deterrents, reasons, etc) or suicidal behavior not mentioned in HPI:	not applicable\par \par  Suicidality - Current/Past (All Sources):\par · Current Passive Ideation:	None known\par · Current Active Ideation:	None known\par · Current Plan:	None known\par · Current Intent:	None known\par · Suicide Attempt:	None known\par · Interrupted Attempt:	None known\par · Aborted (self-interrupted) Attempt:	None known\par · Preparatory Acts:	None known\par · Self injurious behavior without suicidal intent:	None known\par · Additional details of suicidal ideation(frequency,duration,controllability, deterrents, reasons, etc) or suicidal behavior not mentioned in HPI:	N/A\par \par  Suicide Risk Factors:\par · Suicide Risk Factors (Check all that apply)	Current and Past Psychiatric Diagnoses\par · Current and Past Psychiatric Diagnoses	Mood disorder\par \par  Suicide Protective Factors:\par · Suicide Protective Factors (check all that apply)	Identifies reasons for living, Supportive social network of family or friends\par \par HOMICIDALITY / AGGRESSION: \par  Homicidality / Aggression:\par · Homicidality / Aggression (Current/Past)	None known in lifetime\par \par  Violence Risk Factors:\par · Violence Risk Factors:	None Known\par \par  Violence Protective Factors:\par · Violence Protective Factors:	Residential stability, Employment stability\par \par  Access to Firearms:\par · Access to Firearm	No\par \par SUBSTANCE USE: \par  Nicotine:\par · Nicotine	Yes\par · Description (First use, Last use, Quantity, Frequency, Duration)	vaping nicotine several times per day since ~18 years old, daily use\par \par  Other Substance Use:\par · Substance Use	Yes\par · Patient denies substance abuse, other than substances listed below	.\par · Substances Used	Cannabis\par · Cannabis use (First use, Last use, Quantity, Frequency, Duration)	marijuana started at ~19 years old, switched to vaping marijuana within past year or so, daily use\par · Consequences of Substance Use/ Past Treatment	.\par \par OTHER PAST PSYCHIATRIC HISTORY: \par  Other Past Psychiatric History:\par · Other Past Psychiatric History (include details regarding onset, course of illness, inpatient/outpatient treatment)	no formal psych hx\par \par MEDICATION: \par  Current Medication:\par · Current Medication	none\par \par  Psychotropic Medication:\par · Past Psychotropic Medication	reportedly has been on a medication for prior diagnosis of ADHD, doesn't know which one, but stated that it made him more anxious and he stopped taking the medication after 1-2 uses\par \par  Prior Medication Side Effects or Adverse Reactions:\par · Prior Medication Side Effects or Adverse Reactions	None known\par \par PAST MEDICAL HISTORY: \par  Past Medical History:\par · Description	none\par \par REVIEW OF ED CHART: \par  Review of ED Chart for current visit:\par · Vital signs reviewed	Yes\par · Available labs reviewed	Yes\par · Available investigations reviewed (EKG, imaging, etc.)	Yes\par \par FAMILY HISTORY: \par  Family History of Psychiatric Illness/Suicidality/Medical Illness/Substance Use:\par · Family History (Psychiatric illness/suicidality/medical illness/substance use)	None known\par \par SOCIAL HISTORY: \par  Social History:\par · Description	see hpi\par · Legal History	none\par · 	No\par · Abuse / Trauma History	No\par · Adult or Child Protective Services Involvement	No\par \par MEDICAL REVIEW OF SYSTEMS: \par  Medical ROS:\par · Psychiatric (see HPI)	See HPI\par · Medical ROS	.\par \par MENTAL STATUS EXAM: \par  Mental Status Exam:\par · General Appearance	No deformities present\par · Body Habitus	Average build\par · Hygiene	Good\par · Grooming	Good\par · Behavior	Cooperative\par · Eye Contact	Fair\par · Relatedness	Good\par · Impulse Control	Normal\par · Muscle Tone / Strength	Normal muscle tone/strength\par · Abnormal Movements	No abnormal movements\par · Gait / Station	Normal gait / station\par · Speech	Normal volume, rate, productivity, spontaneity and articulation\par · Reported mood	Depressed  Anxious\par · Affect Quality	Depressed  Anxious\par · Affect Range	Blunted\par · Affect Congruence	Congruent\par · Thought Process	Linear\par · Thought Associations	Normal\par · Thought Content	Hopelessness\par · Perceptions	No abnormalities\par · Orientation	Oriented to time, place, person, situation\par · Attention / Concentration	Normal\par · Estimated Intelligence	Average\par · Recent Memory	Normal\par · Remote Memory	Normal\par · Fund of Knowledge	Normal\par · Language	No abnormalities noted\par · Judgment (regarding everyday events)	Fair\par · Insight (regarding psychiatric illness)	Fair\par \par FORMULATION: \par  Formulation:\par · Summary (brief):	21 yo [place]ian-American single male, 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 Patient endorsed 5-6 episodes per day of anxiety over the past several days to the ED. Stated that he sees a therapist, no psychiatrist. Denied SI/HI. Psych consult for mental health evaluation in setting of reported anxiety.  \par \par On assessment patient endorsed sleep changes, interest decreases, feeling of guilt/worthlessness, concentration issues that affect his functioning, loss of energy, appetite changes, and psychomotor retardation. He also endorses numerous episodes of anxiety with palpitations and a feeling of being overwhelmed. He also notes chronic forgetfulness and an inability to follow directions adequately. He also notes daily consumption of cannabis. He denies an suicidal ideation, symptoms of psychosis, PTSD or esmer. Patient likely is experiencing anxiety/panic attacks with major depressive disorder, cannabis use disorder and possible ADHD. \par \par At this time, the patient is not considered an imminent danger to self or others and does not warrant inpatient psychiatric hospitalization (IPP). This patient is cleared from a psychiatric perspective. No psychiatric contraindications to discharge.  \par \par Patient was educated of the risks, benefits, and alternatives of their psychiatric medications, including common potential side effects of medications. Patient expressed understanding and consents to ongoing medication management. \par \par Impression:\par - unspecified anxiety disorder with panic attacks \par - r/o ADHD\par - cannabis-induced mood disorder \par - MDD \par \par Recommendations:\par *- start sertraline 25mg qAM for anxiety/depression. Prescribe 30 day supply on discharge to [facility]. \par *- atarax (hydroxyzine) 50 mg q8h PRN for anxiety/insomnia. Prescribe 30 day supply on discharge to [facility]. \par - discussed with patient and family member to reduce marijuana and cigarette usage due to risk of worsening mood symptoms \par - We recommend that the patient be referred for outpatient psychiatric and psychotherapy services, to [facility] ([facility] [address] [place] [address], phone number : [phone]. Please ensure that this referral information is included in discharge document.\par  - preferred callback numbers for [facility] referral: \par ---- patient's cell - [phone] \par ---- mother's cell (Saadia) - [phone]\par - this document to be faxed to [facility] for expedited referral  - Ext #[phone]\par 	\par · Differential	see summary section above\par · Rationale/Summary (include warning signs, risk factors (static vs modifiable), protective factors, access to lethal means, comment on LEVEL of risk for ALL dangerous behavior, etc.):	risk factors: mood episodes\par protective factors: psychosocial support, employment, school, willingness to seek help, no SI, no SA\par · Risk Assessment	Low Acute Suicide Risk\par · Elevated Chronic Suicide Risk	No\par \par AXIS: \par  Medical Diagnosis (Corresponds to DSM IV - Axis III):\par · Axis III	see hpi\par \par  DSM-IV Axes:\par · Axis I, II, and III	See above\par \par PLAN: \par  Plan:\par · Plan	Treat and Release\par · Referral / Appointment Details	We recommend that the patient be referred for outpatient psychiatric and psychotherapy services, to [facility] [address] [place] [address], phone number : [phone]. Please ensure that this referral information is included in discharge document.\par · Medications (prescriptions, directions)	see summary section above\par · Safety Plan Completed	Yes\par · Details:	in chart\par · Referent to ED contacted regarding plan	Yes\par · Details	yes\par · Last Known Behavioral Health Provider Contacted:	No\par · Details/Comments:	.\par \par ATTESTATION: \par  Attestation Statements:\par Visit Type: Attending with Resident/Fellow/Student. \par \par I have personally seen and examined this patient. I fully participated in the care of this patient. I have made amendments to the documentation where appropriate and otherwise agree with the history, physical exam, and plan as documented by the Resident. \par \par Attending Comments: 21 yo male with history of anxiety in treatment with therapist, but not on any psychotropic medications, who presents to the ED for worsening anxious mood and increasing panic attack occurring as often as 6 times a day for the past weeks. He presents to ED for Mental health evaluation and service connection, and on evaluation he is observed to be calm and cooperative, but there is overt presence of anxiety is the setting of stressor related to discordance with his father, stress at work and persistent marijuana use. This patient also has component of ADHD symptoms which will require further evaluation in the outpatient setting. Also, due to increase in severity of panic attack, he will strongly benefit from low dose of sertraline of 25mg po every morning. Psychoeducation provided to patient and mother who were both at bedside. There is no psychiatric contraindication to discharge this patient. He will be referred to [facility] located at [address] [place] [address], [facility], [phone] and patient will contacted for appointment date.\par \par  Time Based Billing:\par · Time-Based Billing?	yes... \par · Total Time (minutes)	60 \par · Greater than 50% of the time was spent in counseling and/or coordination of care	yes \par · Content of Counseling	diagnostic results/impressions and/or recommended studies; importance of adherence to chosen treatment\par · Coordination Of Care With	medical staff; outpatient provider; family/Caregiver\par \par  Billing Codes:\par · .	Billing in another system \par \par \par Electronic Signatures:\par Leigh Lakhani)  (Signed 14-Jul-2022 16:30)\par 	Authored: HPI, ED COURSE, FORMULATION, ATTESTATION\par 	Co-Signer: TELEPSYCHIATRY, DEMOGRAPHICS, BACKGROUND INFORMATION, ED COURSE, SUICIDALITY RISK ASSESSMENT, HOMICIDALITY / AGGRESSION, SUBSTANCE USE, OTHER PAST PSYCHIATRIC HISTORY, MEDICATION, PAST MEDICAL HISTORY, REVIEW OF ED CHART, FAMILY HISTORY, SOCIAL HISTORY, MEDICAL REVIEW OF SYSTEMS, MENTAL STATUS EXAM\par Yuriy Dyer)  (Signed 14-Jul-2022 12:32)\par 	Authored: TELEPSYCHIATRY, DEMOGRAPHICS, BACKGROUND INFORMATION, HPI, ED COURSE, SUICIDALITY RISK ASSESSMENT, HOMICIDALITY / AGGRESSION, SUBSTANCE USE, OTHER PAST PSYCHIATRIC HISTORY, MEDICATION, PAST MEDICAL HISTORY, REVIEW OF ED CHART, FAMILY HISTORY, SOCIAL HISTORY, MEDICAL REVIEW OF SYSTEMS, MENTAL STATUS EXAM, FORMULATION, AXIS, PLAN, ATTESTATION\par \par \par Last Updated: 14-Jul-2022 16:30 by Leigh Lakhani)\par \par

## 2022-10-24 NOTE — DISCUSSION/SUMMARY
[FreeTextEntry1] : zoloft 50 mg po daily (counselled to medication compliance)\par hydrxyzine 50 mg po prn for anxiety or insomnia. \par f/u 4 weeks.

## 2022-10-26 ENCOUNTER — OUTPATIENT (OUTPATIENT)
Dept: OUTPATIENT SERVICES | Facility: HOSPITAL | Age: 22
LOS: 1 days | Discharge: HOME | End: 2022-10-26

## 2022-10-26 DIAGNOSIS — F33.1 MAJOR DEPRESSIVE DISORDER, RECURRENT, MODERATE: ICD-10-CM

## 2022-10-26 DIAGNOSIS — F41.1 GENERALIZED ANXIETY DISORDER: ICD-10-CM

## 2022-12-01 ENCOUNTER — NON-APPOINTMENT (OUTPATIENT)
Age: 22
End: 2022-12-01

## 2022-12-07 NOTE — REASON FOR VISIT
[Number can be texted] : number can be texted [OK  to leave message] : OK  to leave message [Discharged due to regulatory requirements] : Discharged due to regulatory requirements [FreeTextEntry5] : English [FreeTextEntry6] : Jason [FreeTextEntry9] : 08/31/2022 [FreeTextEntry8] : 12/1/2022 [FreeTextEntry1] : Patient switched from [place] to [facility] which our clinic does not take.

## 2022-12-07 NOTE — DISCUSSION/SUMMARY
[FreeTextEntry1] : Patient is 23 y/o male was being treated for Anxiety, he was being prescribed Zoloft 50 mg by Dr Dihn. Writer is assisting with closing [facility] cases. Patient recently switched from [place] to [facility] which our clinic does not take therefore patient has to be referred out.  Dr Dinh is in agreement of discharging the patient. Writer called the  patient and explained the [facility] situation, he was understanding that his case here at the clinic will be closed today 12/1/22. Writer gave him multiple community referrals including Dr Dinhs private practice info. At the meantime he will follow up with his PCP.

## 2023-08-16 NOTE — ED PEDIATRIC NURSE NOTE - CAS DISCH TRANSFER METHOD
Private car Non-Graft Cartilage Fenestration Text: The cartilage was fenestrated with a 2mm punch biopsy to help facilitate healing.

## 2023-08-17 ENCOUNTER — EMERGENCY (EMERGENCY)
Facility: HOSPITAL | Age: 23
LOS: 0 days | Discharge: HOME | End: 2023-08-17
Attending: EMERGENCY MEDICINE
Payer: COMMERCIAL

## 2023-08-17 VITALS
HEIGHT: 73 IN | WEIGHT: 190.04 LBS | RESPIRATION RATE: 18 BRPM | OXYGEN SATURATION: 98 % | DIASTOLIC BLOOD PRESSURE: 78 MMHG | TEMPERATURE: 98 F | SYSTOLIC BLOOD PRESSURE: 129 MMHG | HEART RATE: 74 BPM

## 2023-08-17 DIAGNOSIS — R09.89 OTHER SPECIFIED SYMPTOMS AND SIGNS INVOLVING THE CIRCULATORY AND RESPIRATORY SYSTEMS: ICD-10-CM

## 2023-08-17 DIAGNOSIS — R10.9 UNSPECIFIED ABDOMINAL PAIN: ICD-10-CM

## 2023-08-17 DIAGNOSIS — R11.2 NAUSEA WITH VOMITING, UNSPECIFIED: ICD-10-CM

## 2023-08-17 DIAGNOSIS — R06.02 SHORTNESS OF BREATH: ICD-10-CM

## 2023-08-17 PROCEDURE — 31575 DIAGNOSTIC LARYNGOSCOPY: CPT

## 2023-08-17 PROCEDURE — 96375 TX/PRO/DX INJ NEW DRUG ADDON: CPT

## 2023-08-17 PROCEDURE — 71045 X-RAY EXAM CHEST 1 VIEW: CPT

## 2023-08-17 PROCEDURE — 99285 EMERGENCY DEPT VISIT HI MDM: CPT

## 2023-08-17 PROCEDURE — 96374 THER/PROPH/DIAG INJ IV PUSH: CPT

## 2023-08-17 PROCEDURE — 99284 EMERGENCY DEPT VISIT MOD MDM: CPT | Mod: 25

## 2023-08-17 PROCEDURE — 99053 MED SERV 10PM-8AM 24 HR FAC: CPT | Mod: 25

## 2023-08-17 PROCEDURE — 71045 X-RAY EXAM CHEST 1 VIEW: CPT | Mod: 26

## 2023-08-17 RX ORDER — GLUCAGON INJECTION, SOLUTION 0.5 MG/.1ML
1 INJECTION, SOLUTION SUBCUTANEOUS ONCE
Refills: 0 | Status: COMPLETED | OUTPATIENT
Start: 2023-08-17 | End: 2023-08-17

## 2023-08-17 RX ORDER — KETOROLAC TROMETHAMINE 30 MG/ML
15 SYRINGE (ML) INJECTION ONCE
Refills: 0 | Status: DISCONTINUED | OUTPATIENT
Start: 2023-08-17 | End: 2023-08-17

## 2023-08-17 RX ORDER — FAMOTIDINE 10 MG/ML
20 INJECTION INTRAVENOUS ONCE
Refills: 0 | Status: COMPLETED | OUTPATIENT
Start: 2023-08-17 | End: 2023-08-17

## 2023-08-17 RX ORDER — METOCLOPRAMIDE HCL 10 MG
10 TABLET ORAL ONCE
Refills: 0 | Status: COMPLETED | OUTPATIENT
Start: 2023-08-17 | End: 2023-08-17

## 2023-08-17 RX ORDER — DEXAMETHASONE 0.5 MG/5ML
10 ELIXIR ORAL ONCE
Refills: 0 | Status: COMPLETED | OUTPATIENT
Start: 2023-08-17 | End: 2023-08-17

## 2023-08-17 RX ORDER — PANTOPRAZOLE SODIUM 20 MG/1
40 TABLET, DELAYED RELEASE ORAL ONCE
Refills: 0 | Status: COMPLETED | OUTPATIENT
Start: 2023-08-17 | End: 2023-08-17

## 2023-08-17 RX ADMIN — FAMOTIDINE 20 MILLIGRAM(S): 10 INJECTION INTRAVENOUS at 04:34

## 2023-08-17 RX ADMIN — Medication 15 MILLIGRAM(S): at 02:46

## 2023-08-17 RX ADMIN — GLUCAGON INJECTION, SOLUTION 1 MILLIGRAM(S): 0.5 INJECTION, SOLUTION SUBCUTANEOUS at 02:46

## 2023-08-17 RX ADMIN — Medication 15 MILLIGRAM(S): at 04:34

## 2023-08-17 RX ADMIN — PANTOPRAZOLE SODIUM 40 MILLIGRAM(S): 20 TABLET, DELAYED RELEASE ORAL at 04:34

## 2023-08-17 RX ADMIN — Medication 10 MILLIGRAM(S): at 03:31

## 2023-08-17 RX ADMIN — Medication 10 MILLIGRAM(S): at 04:34

## 2023-08-17 NOTE — CONSULT NOTE ADULT - ASSESSMENT
Pt is a 24yo Male with no signifcant PMH who presents with sore throat and globus sensation after vomiting this morning. ENT c/s for globus sensation.     Plan:   - FFL scope with no airway obstruction or hemoptysis noted, airway patent   - Continue PPI   - Antiemetics   - Avoid smoking or acidic foods   - No acute ENT intervention at this time   - F/U GI   - F/u OP with ENT for further management at this time   - spoke with ED team

## 2023-08-17 NOTE — CONSULT NOTE ADULT - SUBJECTIVE AND OBJECTIVE BOX
Pt is a 24yo Male with no signifcant PMH who presents with sore throat and globus sensation after vomiting this morning. ENT c/s for globus sensation.  Patient seen and examined at bedside. Patient reports waking up in the middle of night with nausea and multiple episodes of emesis. Patient reports on his last episode noticed streak of blood in his throat. Denies any fever, chills, SOB/difficulty breathing, abdominal pain.     In ED, patient was given Dexamethasone, glucagon, Toradol, Pantoprazole, Metoclopramide, patient states he still feel mild globus sensation after taking glucagon and mild nausea.     PAST MEDICAL & SURGICAL HISTORY:  No pertinent past medical history          MEDICATIONS  (STANDING):    MEDICATIONS  (PRN):      Allergies    No Known Allergies    Intolerances          SOCIAL HISTORY:    FAMILY HISTORY:      REVIEW OF SYSTEMS   [x] A ten-point review of systems was otherwise negative except as noted.      Vital Signs Last 24 Hrs  T(C): 36.5 (17 Aug 2023 01:36), Max: 36.5 (17 Aug 2023 01:36)  T(F): 97.7 (17 Aug 2023 01:36), Max: 97.7 (17 Aug 2023 01:36)  HR: 74 (17 Aug 2023 01:36) (74 - 74)  BP: 129/78 (17 Aug 2023 01:36) (129/78 - 129/78)  RR: 18 (17 Aug 2023 01:36) (18 - 18)  SpO2: 98% (17 Aug 2023 01:36) (98% - 98%)    Parameters below as of 17 Aug 2023 01:36  Patient On (Oxygen Delivery Method): room air        GEN: Well-developed, well-nourished. NAD, awake and alert. No drooling or pooling of secretions. No stridor or stertor. Good vocal quality, no hoarseness.   SKIN: Good color, non diaphoretic.  HEENT: NC/AT; Oral mucosa prink and moist. No erythema or edema noted to buccal mucosa, tongue, FOM, uvula or posterior oropharynx. Uvula midline.   NECK: Traceha midline, Neck supple, no TTP to B/L lateral neck, no cervical LAD.  RESP: No dyspnea, non-labored breathing. No use of accessory muscles.   CARDIO: +S1/S2  ABDO: Soft, NT.  EXT: DE LA FUENTE x 4    Fiberoptic Laryngoscopy: No masses or lesions noted to NP/OP/HP. Laryngeal structures intact, no edema or erythema noted. Epiglottis crisp, no edema. TVC/FVC mobile and intact, no glottic gap noted.     LABS:                RADIOLOGY & ADDITIONAL STUDIES:

## 2023-08-17 NOTE — ED PROVIDER NOTE - OBJECTIVE STATEMENT
Patient 23-year-old male no severe past medical history presenting to ED for evaluation of sore throat and globus sensation since he woke up in the middle the night had multiple episodes of vomiting. Patient states during the last vomiting episode he had streaks of blood in his vomit noted. at this time denies any nausea or abdominal pain. Otherwise denies any fever, chills, headache, changes in vision, cough, congestion, cp, palpitations, sob, constipation, urinary complaints, lower extremity pain/swelling.

## 2023-08-17 NOTE — ED ADULT NURSE NOTE - NSFALLUNIVINTERV_ED_ALL_ED
Bed/Stretcher in lowest position, wheels locked, appropriate side rails in place/Call bell, personal items and telephone in reach/Instruct patient to call for assistance before getting out of bed/chair/stretcher/Non-slip footwear applied when patient is off stretcher/[place] to call system/Physically safe environment - no spills, clutter or unnecessary equipment/Purposeful proactive rounding/Room/bathroom lighting operational, light cord in reach

## 2023-08-17 NOTE — ED PROVIDER NOTE - PROGRESS NOTE DETAILS
pk: will give seltzer, glucagon, decadron obtain cxr and reasssess pk: pt reports ongoing pain, call placed to ENT, will give maalox and ppi.

## 2023-08-17 NOTE — ED ADULT NURSE NOTE - NSFALLRISK_ED_ALL_ED
Date & Time: 4/21/2022, 2:34 PM  Patient: Tan Read  Encounter Provider(s):    Yg Deras MD       To Whom It May Concern:    Tan Read was seen and treated in our department on 4/21/2022. He should not return to work until APRIL 24, 2022.     If you have any questions or concerns, please do not hesitate to call.        _____________________________  Physician/APC Signature No

## 2023-08-17 NOTE — ED PROVIDER NOTE - ATTENDING CONTRIBUTION TO CARE
23-year-old male previously healthy, presents as he states he woke up acutely this evening feeling nauseous and short of breath and needing to vomit, and proceeded to vomit a large amount of his pasta with red sauce and chicken that he had eaten 2-3 times today. He states it was a day old and he is concerned it was . States after this he began feeling as if there was some food stuck in his midline lower throat. Denies actual pain, abdominal pain, current shortness of breath, fever, and all other symptoms. On exam, afebrile, hemodynamically stable, saturating well on room air, NAD, well appearing, sitting comfortably in bed, no WOB/tachypnea, speaking full sentences, no salivary pooling/phonation changes, head NCAT, EOMI grossly, anicteric, MMM, no foreign body or bleeding noted, no neck crepitus or TTP or swelling, no JVD, RRR, nml S1/S2, no m/r/g, lungs CTAB, no w/r/r, abd soft, NT, ND, nml BS, no rebound or guarding, AAO, CN's 3-12 grossly intact, [place] spontaneously, no leg cyanosis or edema, skin warm, well perfused, no rashes or hives. Abdomen entirely benign, with low suspicion for acute intra-abdominal process to warrant imaging. Character low suspicion for ACS. No evidence of Boerhaave's. Patient appears well-hydrated and tolerating p.o. well. ENT scoped and no evidence of foreign body. Symptoms may be secondary to scraping by the food. Patient is well appearing, NAD, afebrile, hemodynamically stable. Any available tests and studies were discussed with patient. Discharged with instructions in further symptomatic care, return precautions, and need for ENT f/u.

## 2023-08-17 NOTE — ED PROVIDER NOTE - PATIENT PORTAL LINK FT
You can access the FollowMyHealth Patient Portal offered by [facility] by registering at the following website: [email or web address]. By joining [facility]’s FollowMyHealth portal, you will also be able to view your health information using other applications (apps) compatible with our system.

## 2023-08-17 NOTE — ED ADULT TRIAGE NOTE - PRO INTERPRETER NEED 2
Department of Anesthesiology  Preprocedure Note       Name:  Bobo Alvarenga   Age:  50 y.o.  :  1968                                          MRN:  [id number]         Date:  6/10/2020      Surgeon: Stefany Morales):  Varun Bustillo DO    Procedure: Procedure(s):  *EGD  *COLONOSCOPY    Medications prior to admission:   Prior to Admission medications    Medication Sig Start Date End Date Taking? Authorizing Provider   bisacodyl (BISACODYL) 5 MG EC tablet Take per bowel prep instructions 20  Yes Varun Bustillo DO   pantoprazole (PROTONIX) 40 MG tablet Take 1 tablet by mouth daily 20  Yes Roland Sever, MD   sucralfate (CARAFATE) 1 GM tablet Take 1 tablet by mouth 4 times daily 20  Yes Roland Sever, MD   fluticasone (FLONASE) 50 MCG/ACT nasal spray 2 sprays by Nasal route daily 10/28/19  Yes Kim Wang, APRN - CNP   metoprolol succinate (TOPROL XL) 25 MG extended release tablet Take 25 mg by mouth daily   Yes Historical Provider, MD   ondansetron (ZOFRAN) 4 MG tablet Take 1 tablet by mouth 3 times daily as needed for Nausea or Vomiting 20   Roland Sever, MD   Alum Hydroxide-Mag Carbonate (GAVISCON PO) Take 2 tablets by mouth nightly. Historical Provider, MD       Current medications:    Current Facility-Administered Medications   Medication Dose Route Frequency Provider Last Rate Last Dose    lactated ringers infusion   Intravenous Continuous Varun Bustillo DO 25 mL/hr at 06/10/20 [id number]      sodium chloride flush 0.9 % injection 10 mL  10 mL Intravenous 2 times per day Varun Bustillo DO        sodium chloride flush 0.9 % injection 10 mL  10 mL Intravenous PRN Varun Bustillo DO           Allergies:     Allergies   Allergen Reactions    Doxycycline Calcium Nausea And Vomiting    Meloxicam Rash    Oxycodone-Acetaminophen Rash       Problem List:    Patient Active Problem List   Diagnosis Code    Anxiety F41.9    Reflux K21.9    DVT of popliteal vein ([facility] [address] I82.439    English

## 2023-08-17 NOTE — ED PROVIDER NOTE - CARE PROVIDER_API CALL
Burt Pimentel  Otolaryngology  [address] [place] [id number]  Phone: [phone]  Fax: [phone]  Follow Up Time: 1-3 Days    Sal Zheng  Gastroenterology  [address] [place] [id number]  Phone: [phone]  Fax: [phone]  Follow Up Time: 1-3 Days

## 2023-08-17 NOTE — ED ADULT TRIAGE NOTE - CHIEF COMPLAINT QUOTE
Patient complaining of abdominal pain with associated vomiting. Patient now complaining of throat pain.

## 2023-08-17 NOTE — ED PROVIDER NOTE - PROVIDER TOKENS
PROVIDER:[TOKEN:[[id number]],FOLLOWUP:[1-3 Days]],PROVIDER:[TOKEN:[[id number]],FOLLOWUP:[1-3 Days]]

## 2023-10-10 ENCOUNTER — APPOINTMENT (OUTPATIENT)
Dept: GASTROENTEROLOGY | Facility: CLINIC | Age: 23
End: 2023-10-10
Payer: COMMERCIAL

## 2023-10-10 VITALS — BODY MASS INDEX: 24.13 KG/M2 | HEIGHT: 74 IN | WEIGHT: 188 LBS

## 2023-10-10 DIAGNOSIS — R13.10 DYSPHAGIA, UNSPECIFIED: ICD-10-CM

## 2023-10-10 DIAGNOSIS — R10.84 GENERALIZED ABDOMINAL PAIN: ICD-10-CM

## 2023-10-10 DIAGNOSIS — R11.2 NAUSEA WITH VOMITING, UNSPECIFIED: ICD-10-CM

## 2023-10-10 DIAGNOSIS — F17.200 NICOTINE DEPENDENCE, UNSPECIFIED, UNCOMPLICATED: ICD-10-CM

## 2023-10-10 DIAGNOSIS — K21.9 GASTRO-ESOPHAGEAL REFLUX DISEASE W/OUT ESOPHAGITIS: ICD-10-CM

## 2023-10-10 DIAGNOSIS — Z78.9 OTHER SPECIFIED HEALTH STATUS: ICD-10-CM

## 2023-10-10 PROCEDURE — 99204 OFFICE O/P NEW MOD 45 MIN: CPT

## 2023-12-19 ENCOUNTER — APPOINTMENT (OUTPATIENT)
Dept: GASTROENTEROLOGY | Facility: CLINIC | Age: 23
End: 2023-12-19

## 2024-05-27 NOTE — ED BEHAVIORAL HEALTH ASSESSMENT NOTE - AFFECT RANGE
Pt to Ed c/o fever, earache and rash since Friday. Denies any other symptoms. UTD on vaccines. Blunted

## 2025-02-12 ENCOUNTER — APPOINTMENT (OUTPATIENT)
Dept: ORTHOPEDIC SURGERY | Facility: CLINIC | Age: 25
End: 2025-02-12